# Patient Record
Sex: FEMALE | Race: WHITE | NOT HISPANIC OR LATINO
[De-identification: names, ages, dates, MRNs, and addresses within clinical notes are randomized per-mention and may not be internally consistent; named-entity substitution may affect disease eponyms.]

---

## 2019-04-15 ENCOUNTER — APPOINTMENT (OUTPATIENT)
Dept: OBGYN | Facility: CLINIC | Age: 64
End: 2019-04-15
Payer: COMMERCIAL

## 2019-04-15 VITALS
BODY MASS INDEX: 29.88 KG/M2 | HEIGHT: 64 IN | WEIGHT: 175 LBS | DIASTOLIC BLOOD PRESSURE: 62 MMHG | SYSTOLIC BLOOD PRESSURE: 120 MMHG

## 2019-04-15 DIAGNOSIS — Z78.9 OTHER SPECIFIED HEALTH STATUS: ICD-10-CM

## 2019-04-15 PROCEDURE — 99396 PREV VISIT EST AGE 40-64: CPT

## 2019-04-15 NOTE — DISCUSSION/SUMMARY
[FreeTextEntry1] : a63 his old white female seen in the office for annual exam physical and pelvic exam within normal limits Pap smear done within the patient for bone density and mammogram and Evista prescription is reviewed I spent 25 minutes of dictation

## 2019-04-15 NOTE — PHYSICAL EXAM
[Acute Distress] : no acute distress [Awake] : awake [Alert] : alert [Mass] : no breast mass [Soft] : soft [Axillary LAD] : no axillary lymphadenopathy [Nipple Discharge] : no nipple discharge [Tender] : non tender [Oriented x3] : oriented to person, place, and time [Normal] : cervix [No Bleeding] : there was no active vaginal bleeding [Uterine Adnexae] : were not tender and not enlarged

## 2019-04-18 LAB
CYTOLOGY CVX/VAG DOC THIN PREP: NORMAL
HPV HIGH+LOW RISK DNA PNL CVX: NOT DETECTED

## 2019-04-29 ENCOUNTER — TRANSCRIPTION ENCOUNTER (OUTPATIENT)
Age: 64
End: 2019-04-29

## 2019-05-06 ENCOUNTER — RX RENEWAL (OUTPATIENT)
Age: 64
End: 2019-05-06

## 2020-06-15 ENCOUNTER — APPOINTMENT (OUTPATIENT)
Dept: OBGYN | Facility: CLINIC | Age: 65
End: 2020-06-15
Payer: COMMERCIAL

## 2020-06-15 VITALS
SYSTOLIC BLOOD PRESSURE: 122 MMHG | HEIGHT: 64 IN | TEMPERATURE: 98.2 F | DIASTOLIC BLOOD PRESSURE: 68 MMHG | BODY MASS INDEX: 26.29 KG/M2 | WEIGHT: 154 LBS

## 2020-06-15 DIAGNOSIS — Z00.00 ENCOUNTER FOR GENERAL ADULT MEDICAL EXAMINATION W/OUT ABNORMAL FINDINGS: ICD-10-CM

## 2020-06-15 DIAGNOSIS — Z82.5 FAMILY HISTORY OF ASTHMA AND OTHER CHRONIC LOWER RESPIRATORY DISEASES: ICD-10-CM

## 2020-06-15 DIAGNOSIS — Z87.19 PERSONAL HISTORY OF OTHER DISEASES OF THE DIGESTIVE SYSTEM: ICD-10-CM

## 2020-06-15 DIAGNOSIS — D49.0 NEOPLASM OF UNSPECIFIED BEHAVIOR OF DIGESTIVE SYSTEM: ICD-10-CM

## 2020-06-15 DIAGNOSIS — Z82.49 FAMILY HISTORY OF ISCHEMIC HEART DISEASE AND OTHER DISEASES OF THE CIRCULATORY SYSTEM: ICD-10-CM

## 2020-06-15 DIAGNOSIS — Z83.518 FAMILY HISTORY OF OTHER SPECIFIED EYE DISORDER: ICD-10-CM

## 2020-06-15 DIAGNOSIS — R92.2 INCONCLUSIVE MAMMOGRAM: ICD-10-CM

## 2020-06-15 DIAGNOSIS — Z80.1 FAMILY HISTORY OF MALIGNANT NEOPLASM OF TRACHEA, BRONCHUS AND LUNG: ICD-10-CM

## 2020-06-15 PROCEDURE — 99396 PREV VISIT EST AGE 40-64: CPT

## 2020-06-15 RX ORDER — OMEPRAZOLE 20 MG/1
TABLET, DELAYED RELEASE ORAL
Refills: 0 | Status: ACTIVE | COMMUNITY

## 2020-06-15 NOTE — PHYSICAL EXAM
[Awake] : awake [Alert] : alert [Acute Distress] : no acute distress [Mass] : no breast mass [Nipple Discharge] : no nipple discharge [Soft] : soft [Axillary LAD] : no axillary lymphadenopathy [Oriented x3] : oriented to person, place, and time [Tender] : non tender [Normal] : uterus [No Bleeding] : there was no active vaginal bleeding [Uterine Adnexae] : were not tender and not enlarged [Occult Blood] : occult blood test from digital rectal exam was negative [No Tenderness] : no rectal tenderness [CTAB] : CTAB [RRR, No Murmurs] : RRR, no murmurs
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
## 2020-06-17 PROBLEM — R92.2 DENSE BREAST: Status: ACTIVE | Noted: 2020-06-17

## 2020-06-18 LAB
CYTOLOGY CVX/VAG DOC THIN PREP: ABNORMAL
DATE COLLECTED: NORMAL
HEMOCCULT SP1 STL QL: NEGATIVE
HPV HIGH+LOW RISK DNA PNL CVX: NOT DETECTED

## 2021-06-17 ENCOUNTER — APPOINTMENT (OUTPATIENT)
Dept: OBGYN | Facility: CLINIC | Age: 66
End: 2021-06-17
Payer: COMMERCIAL

## 2021-06-17 VITALS
SYSTOLIC BLOOD PRESSURE: 108 MMHG | WEIGHT: 167 LBS | HEIGHT: 64 IN | BODY MASS INDEX: 28.51 KG/M2 | DIASTOLIC BLOOD PRESSURE: 60 MMHG

## 2021-06-17 DIAGNOSIS — Z87.19 PERSONAL HISTORY OF OTHER DISEASES OF THE DIGESTIVE SYSTEM: ICD-10-CM

## 2021-06-17 PROCEDURE — 99397 PER PM REEVAL EST PAT 65+ YR: CPT

## 2021-06-17 PROCEDURE — 82270 OCCULT BLOOD FECES: CPT

## 2021-06-17 PROCEDURE — 99072 ADDL SUPL MATRL&STAF TM PHE: CPT

## 2021-06-17 RX ORDER — RALOXIFENE HYDROCHLORIDE 60 MG/1
60 TABLET, FILM COATED ORAL DAILY
Qty: 90 | Refills: 3 | Status: DISCONTINUED | COMMUNITY
Start: 2019-04-15 | End: 2021-06-17

## 2021-06-17 RX ORDER — RALOXIFENE HYDROCHLORIDE 60 MG/1
60 TABLET, FILM COATED ORAL DAILY
Qty: 90 | Refills: 3 | Status: DISCONTINUED | COMMUNITY
Start: 2019-05-06 | End: 2021-06-17

## 2021-06-17 RX ORDER — MULTIVITAMIN
TABLET ORAL
Refills: 0 | Status: ACTIVE | COMMUNITY

## 2021-06-17 NOTE — DISCUSSION/SUMMARY
[FreeTextEntry1] : 64yo  PMF here for annual. \par \par RHM: \par - DVS neg x 3\par - Dentist, PCP, Derm as discussed \par - Rx given for DEXA, mammo/sono\par - Colonoscopy as discussed \par - Encouraged healthy diet, exercise, weight maint \par \par Gabriella Alfred MD \par Obstetrician/Gynecologist\par \par

## 2021-06-18 LAB
DATE COLLECTED: NORMAL
HEMOCCULT SP1 STL QL: NEGATIVE
HPV HIGH+LOW RISK DNA PNL CVX: NOT DETECTED
QUALITY CONTROL: YES

## 2021-06-24 LAB — CYTOLOGY CVX/VAG DOC THIN PREP: ABNORMAL

## 2021-07-09 DIAGNOSIS — M81.0 AGE-RELATED OSTEOPOROSIS W/OUT CURRENT PATHOLOGICAL FRACTURE: ICD-10-CM

## 2021-07-09 DIAGNOSIS — Z87.39 PERSONAL HISTORY OF OTHER DISEASES OF THE MUSCULOSKELETAL SYSTEM AND CONNECTIVE TISSUE: ICD-10-CM

## 2023-03-14 ENCOUNTER — APPOINTMENT (OUTPATIENT)
Dept: OBGYN | Facility: CLINIC | Age: 68
End: 2023-03-14

## 2023-03-14 ENCOUNTER — APPOINTMENT (OUTPATIENT)
Dept: ULTRASOUND IMAGING | Facility: CLINIC | Age: 68
End: 2023-03-14
Payer: MEDICARE

## 2023-03-14 ENCOUNTER — APPOINTMENT (OUTPATIENT)
Dept: RADIOLOGY | Facility: CLINIC | Age: 68
End: 2023-03-14

## 2023-03-14 PROCEDURE — 77080 DXA BONE DENSITY AXIAL: CPT

## 2023-03-14 PROCEDURE — 76536 US EXAM OF HEAD AND NECK: CPT

## 2023-04-12 ENCOUNTER — APPOINTMENT (OUTPATIENT)
Dept: OBGYN | Facility: CLINIC | Age: 68
End: 2023-04-12
Payer: MEDICARE

## 2023-04-12 VITALS
WEIGHT: 172 LBS | BODY MASS INDEX: 23.3 KG/M2 | DIASTOLIC BLOOD PRESSURE: 62 MMHG | SYSTOLIC BLOOD PRESSURE: 114 MMHG | HEIGHT: 72 IN

## 2023-04-12 LAB
DATE COLLECTED: NORMAL
HEMOCCULT SP1 STL QL: NEGATIVE
QUALITY CONTROL: YES

## 2023-04-12 PROCEDURE — 82270 OCCULT BLOOD FECES: CPT

## 2023-04-12 PROCEDURE — 99397 PER PM REEVAL EST PAT 65+ YR: CPT

## 2023-04-12 RX ORDER — ROSUVASTATIN CALCIUM 5 MG/1
TABLET, FILM COATED ORAL
Refills: 0 | Status: ACTIVE | COMMUNITY

## 2023-04-12 RX ORDER — RALOXIFENE HYDROCHLORIDE 60 MG/1
60 TABLET, FILM COATED ORAL DAILY
Qty: 90 | Refills: 3 | Status: ACTIVE | COMMUNITY

## 2023-04-12 NOTE — PHYSICAL EXAM
[Appropriately responsive] : appropriately responsive [Alert] : alert [No Acute Distress] : no acute distress [No Lymphadenopathy] : no lymphadenopathy [Regular Rate Rhythm] : regular rate rhythm [No Murmurs] : no murmurs [Clear to Auscultation B/L] : clear to auscultation bilaterally [Soft] : soft [Non-tender] : non-tender [Non-distended] : non-distended [No HSM] : No HSM [No Lesions] : no lesions [No Mass] : no mass [Oriented x3] : oriented x3 [Examination Of The Breasts] : a normal appearance [No Masses] : no breast masses were palpable [Labia Majora] : normal [Labia Minora] : normal [No Bleeding] : There was no active vaginal bleeding [Normal] : normal [Uterine Adnexae] : normal [FreeTextEntry4] : <1cm nodule palpated in upper R vag. Nothing visualized with speculum.  [FreeTextEntry5] : n [FreeTextEntry9] : Neg

## 2023-04-12 NOTE — DISCUSSION/SUMMARY
[FreeTextEntry1] : 66yo  PMF here for annual with upper vag nodule. \par \par vaginal nodule: \par TVUS and MD visit \par if neg --> Gyn Onc referral \par \par Osteopenia: \par - Raloxifene rx'd \par \par RHM: \par - DVS neg x 3\par - Dentist, PCP, Derm as discussed \par - Rx given for, mammo/sono\par - Colonoscopy as discussed -- pt due  Discussed importance of following up for colon ca screening. She verbalized understanding. \par - Offered STI screen today. Pt declined\par - Encouraged healthy diet, exercise, weight maint. \par \par Gabriella Grace MD \par Obstetrician/Gynecologist\par \par

## 2023-04-12 NOTE — HISTORY OF PRESENT ILLNESS
[Patient reported mammogram was normal] : Patient reported mammogram was normal [Patient reported PAP Smear was normal] : Patient reported PAP Smear was normal [Patient reported bone density results were abnormal] : Patient reported bone density results were abnormal [Patient reported colonoscopy was normal] : Patient reported colonoscopy was normal [FreeTextEntry1] : 68yo  PMF here for annual. No GYN issues today. Pt had bump in cholesterol and is on Crestor.  [Mammogramdate] : 2020 [PapSmeardate] : 2021 [BoneDensityDate] : 2023 [TextBox_37] : osteopenia  [ColonoscopyDate] : 2018  [TextBox_43] : due 2025

## 2023-04-14 LAB — HPV HIGH+LOW RISK DNA PNL CVX: NOT DETECTED

## 2023-04-17 LAB — CYTOLOGY CVX/VAG DOC THIN PREP: ABNORMAL

## 2023-04-19 ENCOUNTER — APPOINTMENT (OUTPATIENT)
Dept: ULTRASOUND IMAGING | Facility: CLINIC | Age: 68
End: 2023-04-19
Payer: MEDICARE

## 2023-04-19 ENCOUNTER — APPOINTMENT (OUTPATIENT)
Dept: MAMMOGRAPHY | Facility: CLINIC | Age: 68
End: 2023-04-19
Payer: MEDICARE

## 2023-04-19 ENCOUNTER — RESULT REVIEW (OUTPATIENT)
Age: 68
End: 2023-04-19

## 2023-04-19 PROCEDURE — 76641 ULTRASOUND BREAST COMPLETE: CPT | Mod: 50

## 2023-04-19 PROCEDURE — 77063 BREAST TOMOSYNTHESIS BI: CPT

## 2023-04-19 PROCEDURE — 77067 SCR MAMMO BI INCL CAD: CPT

## 2023-04-21 ENCOUNTER — NON-APPOINTMENT (OUTPATIENT)
Age: 68
End: 2023-04-21

## 2023-04-21 ENCOUNTER — APPOINTMENT (OUTPATIENT)
Dept: ANTEPARTUM | Facility: CLINIC | Age: 68
End: 2023-04-21
Payer: MEDICARE

## 2023-04-21 ENCOUNTER — APPOINTMENT (OUTPATIENT)
Dept: OBGYN | Facility: CLINIC | Age: 68
End: 2023-04-21
Payer: MEDICARE

## 2023-04-21 ENCOUNTER — ASOB RESULT (OUTPATIENT)
Age: 68
End: 2023-04-21

## 2023-04-21 VITALS
WEIGHT: 172 LBS | DIASTOLIC BLOOD PRESSURE: 71 MMHG | BODY MASS INDEX: 23.3 KG/M2 | SYSTOLIC BLOOD PRESSURE: 116 MMHG | HEIGHT: 72 IN

## 2023-04-21 DIAGNOSIS — N85.9 NONINFLAMMATORY DISORDER OF UTERUS, UNSPECIFIED: ICD-10-CM

## 2023-04-21 PROCEDURE — 99212 OFFICE O/P EST SF 10 MIN: CPT

## 2023-04-21 PROCEDURE — 76830 TRANSVAGINAL US NON-OB: CPT

## 2023-04-21 PROCEDURE — 76856 US EXAM PELVIC COMPLETE: CPT | Mod: 59

## 2023-04-21 RX ORDER — MISOPROSTOL 200 UG/1
200 TABLET ORAL
Qty: 4 | Refills: 0 | Status: ACTIVE | COMMUNITY
Start: 2023-04-21 | End: 1900-01-01

## 2023-04-21 NOTE — HISTORY OF PRESENT ILLNESS
[FreeTextEntry1] : 68yo  PMF here for results review. TVUS demonstrates 4mm EMS with fluid in cavity and 1cm calcified lesion (? polyp). Neither ovary visualized. mammo/sono/HPV/pap wnl.

## 2023-04-21 NOTE — DISCUSSION/SUMMARY
[FreeTextEntry1] : EndoSee and Endo bx May 1 @ 9am \par Cytotec night prior and tylenol 2hr prior \par - Strict precautions and aftercare instructions reviewed \par - Aware she cannot have anything in vagina or be submerged in water until seen by me \par \par Gabriella Grace MD \par Obstetrician/Gynecologist\par \par \par \par

## 2023-04-25 ENCOUNTER — NON-APPOINTMENT (OUTPATIENT)
Age: 68
End: 2023-04-25

## 2023-05-01 ENCOUNTER — APPOINTMENT (OUTPATIENT)
Dept: OBGYN | Facility: CLINIC | Age: 68
End: 2023-05-01
Payer: MEDICARE

## 2023-05-01 VITALS
BODY MASS INDEX: 23.3 KG/M2 | DIASTOLIC BLOOD PRESSURE: 62 MMHG | WEIGHT: 172 LBS | HEIGHT: 72 IN | SYSTOLIC BLOOD PRESSURE: 132 MMHG

## 2023-05-01 PROCEDURE — 58558Z: CUSTOM

## 2023-05-01 NOTE — PLAN
[FreeTextEntry1] : Submucosal fibroid vs polyp and fluid in canal. \par \par We discussed that a very small percentage of polyps are abnl (pre-cancerous or cancerous) and that we will send it to path. We discussed the R/B/A of the procedure including but not limited to bleeding, infection incomplete/need for repeat/need for further procedure, injury to surrounding organs (bowel, bladder, uterus, tubes, ovaries, cervix), risk associated with anesthesia.We discussed post op care, expectations  and precautions as well.\par \par - Strict precautions and aftercare instructions reviewed \par - Aware she cannot have anything in vagina or be submerged in water until seen by me \par - RTO in 2 wks for follow up and results \par \par Gabriella Grace MD \par Obstetrician/Gynecologist\par \par \par

## 2023-05-01 NOTE — PROCEDURE
[Hysteroscopy] : Hysteroscopy [Time out performed] : Pre-procedure time out performed.  Patient's name, date of birth and procedure confirmed. [Consent Obtained] : Consent obtained [Risks] : risks [Benefits] : benefits [Alternatives] : alternatives [Patient] : patient [Infection] : infection [Bleeding] : bleeding [Allergic Reaction] : allergic reaction [rigid] : Using aseptic technique a hysteroscopy was performed using a rigid hysteroscope [Tolerated Well] : Patient tolerated the procedure well [Hemostasis obtained] : hemostasis obtained [Sent to Pathology] : specimen not sent for pathology [Antibiotics given] : antibiotics not given [de-identified] : fluid in endometrial cavity and ? calcified endometrial polyp  [de-identified] : atrophic endometrium with submucosal fibroid vs polyp appearing lesion on anterior wall distal to R tubal ostia.  Pt felt lightheaded therefore endo bx portion of case could not be done.

## 2023-06-01 ENCOUNTER — APPOINTMENT (OUTPATIENT)
Dept: ULTRASOUND IMAGING | Facility: CLINIC | Age: 68
End: 2023-06-01
Payer: MEDICARE

## 2023-06-01 ENCOUNTER — OUTPATIENT (OUTPATIENT)
Dept: OUTPATIENT SERVICES | Facility: HOSPITAL | Age: 68
LOS: 1 days | End: 2023-06-01

## 2023-06-01 DIAGNOSIS — Z00.8 ENCOUNTER FOR OTHER GENERAL EXAMINATION: ICD-10-CM

## 2023-06-01 PROCEDURE — 10005 FNA BX W/US GDN 1ST LES: CPT

## 2023-06-01 PROCEDURE — 88173 CYTOPATH EVAL FNA REPORT: CPT | Mod: 26

## 2023-07-05 ENCOUNTER — APPOINTMENT (OUTPATIENT)
Dept: OBGYN | Facility: CLINIC | Age: 68
End: 2023-07-05

## 2023-07-19 ENCOUNTER — RESULT REVIEW (OUTPATIENT)
Age: 68
End: 2023-07-19

## 2023-07-19 ENCOUNTER — APPOINTMENT (OUTPATIENT)
Dept: OBGYN | Facility: HOSPITAL | Age: 68
End: 2023-07-19

## 2023-07-31 ENCOUNTER — APPOINTMENT (OUTPATIENT)
Dept: OBGYN | Facility: CLINIC | Age: 68
End: 2023-07-31
Payer: MEDICARE

## 2023-07-31 PROCEDURE — 99212 OFFICE O/P EST SF 10 MIN: CPT

## 2023-08-16 NOTE — DISCUSSION/SUMMARY
[FreeTextEntry1] : 67yo  PMF here for post operative visit with benign results.   - Pt ok to resume all life activities  - All questions were solicited and answered  Gabriella Grcae MD  Obstetrician/Gynecologist

## 2023-08-16 NOTE — HISTORY OF PRESENT ILLNESS
[FreeTextEntry1] : 67yo  PMF here for post operative visit. She had an uncomplicated hysteroscopy, D&C polypectomy, with benign results on her EMC and fluid.  Her EMC demonstrates scant inactive endometrium and polyp fragments.  She has no issues post procedure.

## 2024-01-18 ENCOUNTER — APPOINTMENT (RX ONLY)
Dept: URBAN - METROPOLITAN AREA CLINIC 74 | Facility: CLINIC | Age: 69
Setting detail: DERMATOLOGY
End: 2024-01-18

## 2024-01-18 VITALS — WEIGHT: 167 LBS | HEIGHT: 55 IN

## 2024-01-18 DIAGNOSIS — Z71.89 OTHER SPECIFIED COUNSELING: ICD-10-CM

## 2024-01-18 DIAGNOSIS — L81.4 OTHER MELANIN HYPERPIGMENTATION: ICD-10-CM

## 2024-01-18 DIAGNOSIS — L57.8 OTHER SKIN CHANGES DUE TO CHRONIC EXPOSURE TO NONIONIZING RADIATION: ICD-10-CM

## 2024-01-18 DIAGNOSIS — L82.1 OTHER SEBORRHEIC KERATOSIS: ICD-10-CM

## 2024-01-18 DIAGNOSIS — L82.0 INFLAMED SEBORRHEIC KERATOSIS: ICD-10-CM

## 2024-01-18 DIAGNOSIS — D22 MELANOCYTIC NEVI: ICD-10-CM | Status: WELL CONTROLLED

## 2024-01-18 DIAGNOSIS — L259 CONTACT DERMATITIS AND OTHER ECZEMA, UNSPECIFIED CAUSE: ICD-10-CM | Status: INADEQUATELY CONTROLLED

## 2024-01-18 DIAGNOSIS — D18.0 HEMANGIOMA: ICD-10-CM

## 2024-01-18 DIAGNOSIS — L57.0 ACTINIC KERATOSIS: ICD-10-CM

## 2024-01-18 PROBLEM — D18.01 HEMANGIOMA OF SKIN AND SUBCUTANEOUS TISSUE: Status: ACTIVE | Noted: 2024-01-18

## 2024-01-18 PROBLEM — D22.5 MELANOCYTIC NEVI OF TRUNK: Status: ACTIVE | Noted: 2024-01-18

## 2024-01-18 PROBLEM — L30.9 DERMATITIS, UNSPECIFIED: Status: ACTIVE | Noted: 2024-01-18

## 2024-01-18 PROCEDURE — 99204 OFFICE O/P NEW MOD 45 MIN: CPT | Mod: 25

## 2024-01-18 PROCEDURE — ? SUNSCREEN RECOMMENDATIONS

## 2024-01-18 PROCEDURE — 17000 DESTRUCT PREMALG LESION: CPT | Mod: 59

## 2024-01-18 PROCEDURE — 17110 DESTRUCTION B9 LES UP TO 14: CPT

## 2024-01-18 PROCEDURE — ? PRESCRIPTION MEDICATION MANAGEMENT

## 2024-01-18 PROCEDURE — ? COUNSELING

## 2024-01-18 PROCEDURE — 17003 DESTRUCT PREMALG LES 2-14: CPT | Mod: 59

## 2024-01-18 PROCEDURE — ? SUNSCREEN TREATMENT REGIMEN

## 2024-01-18 PROCEDURE — ? PRESCRIPTION

## 2024-01-18 PROCEDURE — ? TREATMENT REGIMEN

## 2024-01-18 PROCEDURE — ? LIQUID NITROGEN

## 2024-01-18 PROCEDURE — ? FULL BODY SKIN EXAM

## 2024-01-18 RX ORDER — TRIAMCINOLONE ACETONIDE 1 MG/G
CREAM TOPICAL
Qty: 80 | Refills: 3 | Status: ERX | COMMUNITY
Start: 2024-01-18

## 2024-01-18 RX ADMIN — TRIAMCINOLONE ACETONIDE: 1 CREAM TOPICAL at 00:00

## 2024-01-18 ASSESSMENT — LOCATION SIMPLE DESCRIPTION DERM
LOCATION SIMPLE: RIGHT CHEEK
LOCATION SIMPLE: ABDOMEN
LOCATION SIMPLE: RIGHT UPPER ARM
LOCATION SIMPLE: RIGHT UPPER BACK
LOCATION SIMPLE: CHEST
LOCATION SIMPLE: NOSE
LOCATION SIMPLE: LEFT UPPER BACK
LOCATION SIMPLE: RIGHT LOWER BACK

## 2024-01-18 ASSESSMENT — LOCATION DETAILED DESCRIPTION DERM
LOCATION DETAILED: RIGHT MID-UPPER BACK
LOCATION DETAILED: RIGHT LATERAL SUPERIOR CHEST
LOCATION DETAILED: RIGHT INFERIOR MEDIAL MIDBACK
LOCATION DETAILED: RIGHT MEDIAL SUPERIOR CHEST
LOCATION DETAILED: NASAL DORSUM
LOCATION DETAILED: RIGHT INFERIOR MEDIAL UPPER BACK
LOCATION DETAILED: RIGHT LATERAL PROXIMAL UPPER ARM
LOCATION DETAILED: RIGHT SUPERIOR UPPER BACK
LOCATION DETAILED: RIGHT INFERIOR CENTRAL MALAR CHEEK
LOCATION DETAILED: LEFT SUPERIOR UPPER BACK
LOCATION DETAILED: EPIGASTRIC SKIN

## 2024-01-18 ASSESSMENT — LOCATION ZONE DERM
LOCATION ZONE: NOSE
LOCATION ZONE: TRUNK
LOCATION ZONE: FACE
LOCATION ZONE: ARM

## 2024-01-18 ASSESSMENT — SEVERITY ASSESSMENT: SEVERITY: MILD TO MODERATE

## 2024-01-18 ASSESSMENT — ITCH NUMERIC RATING SCALE: HOW SEVERE IS YOUR ITCHING?: 5

## 2024-01-18 ASSESSMENT — BSA RASH: BSA RASH: 8

## 2024-01-18 NOTE — PROCEDURE: PRESCRIPTION MEDICATION MANAGEMENT
Detail Level: Zone
Initiate Treatment: triamcinolone acetonide 0.1 % topical cream \\nQuantity: 80.0 g  Days Supply: 30\\nSig: Apply to AA on rash on back BID for 2 weeks on 1 week off. Do not apply to face or groin
Render In Strict Bullet Format?: No

## 2024-01-18 NOTE — PROCEDURE: FULL BODY SKIN EXAM
Instructions: This plan will send the code FBSE to the PM system.  DO NOT or CHANGE the price.
Price (Do Not Change): 0.00
Body Of Note (Please Add Your Own Text Here): Will set it up
Detail Level: Generalized

## 2024-01-18 NOTE — PROCEDURE: LIQUID NITROGEN
Show Applicator Variable?: Yes
Render Post-Care Instructions In Note?: no
Duration Of Freeze Thaw-Cycle (Seconds): 2
Number Of Freeze-Thaw Cycles: 3 freeze-thaw cycles
Post-Care Instructions: I reviewed with the patient in detail post-care instructions. Patient is to wear sunprotection, and avoid picking at any of the treated lesions. Pt may apply Vaseline to crusted or scabbing areas.
Application Tool (Optional): Liquid Nitrogen Sprayer
Detail Level: Detailed
Consent: The patient's consent was obtained including but not limited to risks of crusting, scabbing, blistering, scarring, darker or lighter pigmentary change, recurrence, incomplete removal and infection.
Duration Of Freeze Thaw-Cycle (Seconds): 1
Spray Paint Text: The liquid nitrogen was applied to the skin utilizing a spray paint frosting technique.
Medical Necessity Clause: This procedure was medically necessary because the lesions that were treated were:
Medical Necessity Information: It is in your best interest to select a reason for this procedure from the list below. All of these items fulfill various CMS LCD requirements except the new and changing color options.

## 2025-08-12 ENCOUNTER — APPOINTMENT (OUTPATIENT)
Dept: URBAN - METROPOLITAN AREA CLINIC 74 | Facility: CLINIC | Age: 70
Setting detail: DERMATOLOGY
End: 2025-08-12

## 2025-08-12 DIAGNOSIS — D22 MELANOCYTIC NEVI: ICD-10-CM

## 2025-08-12 DIAGNOSIS — L81.4 OTHER MELANIN HYPERPIGMENTATION: ICD-10-CM

## 2025-08-12 DIAGNOSIS — Z41.9 ENCOUNTER FOR PROCEDURE FOR PURPOSES OTHER THAN REMEDYING HEALTH STATE, UNSPECIFIED: ICD-10-CM

## 2025-08-12 DIAGNOSIS — D18.0 HEMANGIOMA: ICD-10-CM

## 2025-08-12 DIAGNOSIS — L82.1 OTHER SEBORRHEIC KERATOSIS: ICD-10-CM

## 2025-08-12 PROBLEM — D18.01 HEMANGIOMA OF SKIN AND SUBCUTANEOUS TISSUE: Status: ACTIVE | Noted: 2025-08-12

## 2025-08-12 PROBLEM — D22.5 MELANOCYTIC NEVI OF TRUNK: Status: ACTIVE | Noted: 2025-08-12

## 2025-08-12 PROBLEM — D22.62 MELANOCYTIC NEVI OF LEFT UPPER LIMB, INCLUDING SHOULDER: Status: ACTIVE | Noted: 2025-08-12

## 2025-08-12 PROBLEM — D22.72 MELANOCYTIC NEVI OF LEFT LOWER LIMB, INCLUDING HIP: Status: ACTIVE | Noted: 2025-08-12

## 2025-08-12 PROCEDURE — ? COSMETIC CONSULTATION - MICRO-NEEDLING

## 2025-08-12 PROCEDURE — ? MEDICAL CONSULTATION: LASER RESURFACING

## 2025-08-12 PROCEDURE — ? COUNSELING

## 2025-08-12 PROCEDURE — ? COSMETIC CONSULTATION: FILLERS

## 2025-08-12 PROCEDURE — ? ADDITIONAL NOTES

## 2025-08-12 PROCEDURE — ? COSMETIC CONSULTATION: BOTULINUM TOXIN

## 2025-08-12 PROCEDURE — ? TREATMENT REGIMEN

## 2025-08-12 ASSESSMENT — LOCATION SIMPLE DESCRIPTION DERM
LOCATION SIMPLE: LEFT CHEEK
LOCATION SIMPLE: LEFT FOREARM
LOCATION SIMPLE: RIGHT LIP
LOCATION SIMPLE: LEFT FOREHEAD
LOCATION SIMPLE: LEFT THIGH
LOCATION SIMPLE: LEFT POSTERIOR UPPER ARM
LOCATION SIMPLE: LEFT LOWER BACK
LOCATION SIMPLE: RIGHT CHEEK
LOCATION SIMPLE: RIGHT UPPER LIP
LOCATION SIMPLE: LEFT BREAST
LOCATION SIMPLE: CHEST
LOCATION SIMPLE: RIGHT UPPER BACK
LOCATION SIMPLE: LEFT UPPER BACK
LOCATION SIMPLE: ABDOMEN
LOCATION SIMPLE: RIGHT FOREARM
LOCATION SIMPLE: LEFT LIP
LOCATION SIMPLE: RIGHT THIGH
LOCATION SIMPLE: RIGHT CALF

## 2025-08-12 ASSESSMENT — LOCATION ZONE DERM
LOCATION ZONE: TRUNK
LOCATION ZONE: FACE
LOCATION ZONE: ARM
LOCATION ZONE: LIP
LOCATION ZONE: LEG

## 2025-08-12 ASSESSMENT — LOCATION DETAILED DESCRIPTION DERM
LOCATION DETAILED: LEFT ANTERIOR DISTAL THIGH
LOCATION DETAILED: LEFT INFERIOR LATERAL MIDBACK
LOCATION DETAILED: LEFT PROXIMAL DORSAL FOREARM
LOCATION DETAILED: UPPER STERNUM
LOCATION DETAILED: RIGHT UPPER CUTANEOUS LIP
LOCATION DETAILED: RIGHT DISTAL CALF
LOCATION DETAILED: LEFT LATERAL BUCCAL CHEEK
LOCATION DETAILED: RIGHT SUPERIOR UPPER BACK
LOCATION DETAILED: LEFT MEDIAL BREAST 7-8:00 REGION
LOCATION DETAILED: LEFT SUPERIOR FOREHEAD
LOCATION DETAILED: RIGHT SUPERIOR VERMILION BORDER
LOCATION DETAILED: LEFT PROXIMAL POSTERIOR UPPER ARM
LOCATION DETAILED: RIGHT RIB CAGE
LOCATION DETAILED: RIGHT CENTRAL MALAR CHEEK
LOCATION DETAILED: RIGHT MID-UPPER BACK
LOCATION DETAILED: EPIGASTRIC SKIN
LOCATION DETAILED: LEFT CENTRAL MALAR CHEEK
LOCATION DETAILED: RIGHT ANTERIOR PROXIMAL THIGH
LOCATION DETAILED: LEFT SUPERIOR UPPER BACK
LOCATION DETAILED: LEFT UPPER CUTANEOUS LIP
LOCATION DETAILED: RIGHT PROXIMAL DORSAL FOREARM